# Patient Record
Sex: MALE | Race: WHITE | Employment: OTHER | URBAN - METROPOLITAN AREA
[De-identification: names, ages, dates, MRNs, and addresses within clinical notes are randomized per-mention and may not be internally consistent; named-entity substitution may affect disease eponyms.]

---

## 2018-01-14 PROBLEM — I10 HTN (HYPERTENSION): Status: ACTIVE | Noted: 2018-01-14

## 2018-01-14 PROBLEM — E78.00 HIGH CHOLESTEROL: Status: ACTIVE | Noted: 2018-01-14

## 2018-01-14 PROBLEM — F41.9 ANXIETY: Status: ACTIVE | Noted: 2018-01-14

## 2018-01-14 PROBLEM — R07.9 CHEST PAIN: Status: ACTIVE | Noted: 2018-01-14

## 2018-06-20 ENCOUNTER — HOSPITAL ENCOUNTER (OUTPATIENT)
Dept: ENDOSCOPY | Age: 78
Discharge: HOME | End: 2018-06-20
Attending: INTERNAL MEDICINE | Admitting: INTERNAL MEDICINE

## 2018-06-20 ASSESSMENT — ENCOUNTER SYMPTOMS: SHORTNESS OF BREATH: 0

## 2019-01-24 ENCOUNTER — HOSPITAL ENCOUNTER (OUTPATIENT)
Age: 79
Discharge: HOME | End: 2019-01-24
Payer: MEDICARE

## 2019-01-24 PROCEDURE — 83516 IMMUNOASSAY NONANTIBODY: CPT

## 2019-01-25 ENCOUNTER — HOSPITAL ENCOUNTER (OUTPATIENT)
Age: 79
Discharge: HOME | End: 2019-01-25
Payer: MEDICARE

## 2019-01-25 PROCEDURE — 83993 ASSAY FOR CALPROTECTIN FECAL: CPT

## 2019-01-26 LAB — TISSUE TRANSGLUTAMINASE IGA: 0 U/ML (ref 0–3)

## 2019-01-29 LAB — CALPROTECTIN: 20 UG/G

## 2020-02-26 ENCOUNTER — OFFICE VISIT (OUTPATIENT)
Dept: ORTHOPEDIC SURGERY | Age: 80
End: 2020-02-26
Payer: MEDICARE

## 2020-02-26 VITALS — WEIGHT: 185 LBS | HEIGHT: 73 IN | BODY MASS INDEX: 24.52 KG/M2

## 2020-02-26 PROCEDURE — 1036F TOBACCO NON-USER: CPT | Performed by: ORTHOPAEDIC SURGERY

## 2020-02-26 PROCEDURE — 4040F PNEUMOC VAC/ADMIN/RCVD: CPT | Performed by: ORTHOPAEDIC SURGERY

## 2020-02-26 PROCEDURE — G8427 DOCREV CUR MEDS BY ELIG CLIN: HCPCS | Performed by: ORTHOPAEDIC SURGERY

## 2020-02-26 PROCEDURE — 1123F ACP DISCUSS/DSCN MKR DOCD: CPT | Performed by: ORTHOPAEDIC SURGERY

## 2020-02-26 PROCEDURE — G8484 FLU IMMUNIZE NO ADMIN: HCPCS | Performed by: ORTHOPAEDIC SURGERY

## 2020-02-26 PROCEDURE — G8420 CALC BMI NORM PARAMETERS: HCPCS | Performed by: ORTHOPAEDIC SURGERY

## 2020-02-26 PROCEDURE — 99203 OFFICE O/P NEW LOW 30 MIN: CPT | Performed by: ORTHOPAEDIC SURGERY

## 2020-02-26 RX ORDER — MELOXICAM 15 MG/1
15 TABLET ORAL DAILY
Qty: 30 TABLET | Refills: 3 | Status: SHIPPED | OUTPATIENT
Start: 2020-02-26

## 2020-02-26 NOTE — PROGRESS NOTES
Chief Complaint    Shoulder Pain (left shoulder)      History of Present Illness:  Fam Burk is a 79 y.o. male. He is here today for evaluation of his left shoulder. He is right-hand dominant. He has had left shoulder pain that is been getting progressively worse over the last 8 months. Denies any particular injury to his shoulder which onset of the pain. The main thing he notices is pain through range of motion as well as some crepitus within the shoulder. Also feels like he is losing some strength around his left shoulder. He denies radicular pain. Denies numbness or tingling. Currently not using any medications to help treat the shoulder. Medical History:  Patient's medications, allergies, past medical, surgical, social and family histories were reviewed and updated as appropriate. Review of Systems:  Pertinent items are noted in HPI  Review of systems reviewed from Patient History Form dated on 2/26/20 and available in the patient's chart under the Media tab. Vital Signs:  Ht 6' 1\" (1.854 m)   Wt 185 lb (83.9 kg)   BMI 24.41 kg/m²     General Exam:   Constitutional: Patient is adequately groomed with no evidence of malnutrition  DTRs: Deep tendon reflexes are intact  Mental Status: The patient is oriented to time, place and person. The patient's mood and affect are appropriate. Shoulder Examination:    Inspection: No significant swelling erythema noted but the left shoulder today    Palpation: There is some palpable crepitus over the left shoulder through range of motion. No tenderness over the [facility] [place] joint    Range of Motion: Active forward elevation today is 110 degrees. Passively we can get him to 140 degrees. External rotation is 30 degrees    Strength:  There is some weakness noted with rotator cuff testing in all directions secondary to pain inhibition    Special Tests: Negative drop arm exam.  There is some pain weakness with Drayton's active compression testing    Skin: There are no rashes, ulcerations or lesions. Gait: Normal      Additional Comments:       Additional Examinations:         Right Upper Extremity:  Examination of the right upper extremity does not show any tenderness, deformity or injury. Range of motion is unremarkable. There is no gross instability. There are no rashes, ulcerations or lesions. Strength and tone are normal.    Radiology:     X-rays obtained and reviewed in office:  Views 4 views left shoulder demonstrates no obvious fracture dislocation. There is significant glenohumeral degenerative change with joint space loss and osteophyte formation. Assessment : Left shoulder glenohumeral arthritis    Impression:  Encounter Diagnoses   Name Primary?  Left shoulder pain, unspecified chronicity Yes    Shoulder arthritis        Office Procedures:  Orders Placed This Encounter   Procedures    XR SHOULDER LEFT (MIN 2 VIEWS)     Standing Status:   Future     Number of Occurrences:   1     Standing Expiration Date:   2/26/2021    Ambulatory referral to Physical Therapy     Referral Priority:   Routine     Referral Type:   Eval and Treat     Referral Reason:   Specialty Services Required     Requested Specialty:   Physical Therapy     Number of Visits Requested:   1       Treatment Plan: I discussed the diagnosis and treatment options with him today. Recommend at this time referral to physical therapy to work on range of motion and strengthening. I am also going to place him on to a prescription for meloxicam 15 mg that he can use daily. We also did briefly discuss total shoulder arthroplasty but he does not want to consider that at this point in time.   He would be a good candidate in the future though if were not able to control his symptoms nonoperatively

## 2020-03-03 ENCOUNTER — HOSPITAL ENCOUNTER (OUTPATIENT)
Dept: PHYSICAL THERAPY | Age: 80
Setting detail: THERAPIES SERIES
Discharge: HOME | End: 2020-03-03
Payer: MEDICARE

## 2020-03-03 PROCEDURE — 97110 THERAPEUTIC EXERCISES: CPT

## 2020-03-03 PROCEDURE — G0283 ELEC STIM OTHER THAN WOUND: HCPCS

## 2020-03-03 PROCEDURE — 97140 MANUAL THERAPY 1/> REGIONS: CPT

## 2020-03-03 PROCEDURE — 97161 PT EVAL LOW COMPLEX 20 MIN: CPT

## 2020-03-03 NOTE — FLOWSHEET NOTE
scapular, scapulothoracic and UE control with self care, reaching, carrying, lifting, house/yardwork, driving/computer work.    [] (48751) Provided verbal/tactile cueing for activities related to improving balance, coordination, kinesthetic sense, posture, motor skill, proprioception  to assist with  scapular, scapulothoracic and UE control with self care, reaching, carrying, lifting, house/yardwork, driving/computer work. Therapeutic Activities:    [x] (26116 or 26310) Provided verbal/tactile cueing for activities related to improving balance, coordination, kinesthetic sense, posture, motor skill, proprioception and motor activation to allow for proper function of scapular, scapulothoracic and UE control with self care, carrying, lifting, driving/computer work.      Home Exercise Program:    [x] (80898) Reviewed/Progressed HEP activities related to strengthening, flexibility, endurance, ROM of scapular, scapulothoracic and UE control with self care, reaching, carrying, lifting, house/yardwork, driving/computer work  [] (34634) Reviewed/Progressed HEP activities related to improving balance, coordination, kinesthetic sense, posture, motor skill, proprioception of scapular, scapulothoracic and UE control with self care, reaching, carrying, lifting, house/yardwork, driving/computer work      Manual Treatments:  PROM / STM / Oscillations-Mobs:  G-I, II, III, IV (PA's, Inf., Post.)  [x] (31879) Provided manual therapy to mobilize soft tissue/joints of cervical/CT, scapular GHJ and UE for the purpose of modulating pain, promoting relaxation,  increasing ROM, reducing/eliminating soft tissue swelling/inflammation/restriction, improving soft tissue extensibility and allowing for proper ROM for normal function with self care, reaching, carrying, lifting, house/yardwork, driving/computer work    Modalities:  TENS x 15 min    Charges:  Timed Code Treatment Minutes: eval +25   Total Treatment Minutes: 70       [x] EVAL (LOW)

## 2020-03-03 NOTE — PLAN OF CARE
[]Disc pathology   []Congenital spine pathologies   []Prior surgical intervention  []Osteoporosis (M81.8)  []Osteopenia (M85.8)   Endocrine conditions   []Hypothyroid (E03.9)  []Hyperthyroid Gastrointestinal conditions   []Constipation (R15.10)   Metabolic conditions   []Morbid obesity (E66.01)  []Diabetes type 1(E10.65) or 2 (E11.65)   []Neuropathy (G60.9)     Pulmonary conditions   []Asthma (J45)  []Coughing   []COPD (J44.9)   Psychological Disorders  []Anxiety (F41.9)  []Depression (F32.9)   []Other:   []Other:          Barriers to/and or personal factors that will affect rehab potential:              [x]Age  []Sex              []Motivation/Lack of Motivation                        []Co-Morbidities              []Cognitive Function, education/learning barriers              []Environmental, home barriers              []profession/work barriers  []past PT/medical experience  []other:  Justification:      Falls Risk Assessment (30 days):   [x] Falls Risk assessed and no intervention required.   [] Falls Risk assessed and Patient requires intervention due to being higher risk   TUG score (>12s at risk):     [] Falls education provided, including        ASSESSMENT: s/s consistent with left GH OA  Functional Impairments   [x]Noted spinal or UE joint hypomobility   []Noted spinal or UE joint hypermobility   [x]Decreased UE functional ROM   [x]Decreased UE functional strength   []Abnormal reflexes/sensation/myotomal/dermatomal deficits   [x]Decreased RC/scapular/core strength and neuromuscular control   []other:      Functional Activity Limitations (from functional questionnaire and intake)   [x]Reduced ability to tolerate prolonged functional positions   [x]Reduced ability or difficulty with changes of positions or transfers between positions   []Reduced ability to maintain good posture and demonstrate good body mechanics with sitting, bending, and lifting   [] Reduced ability or tolerance with driving and/or computer limitations, and/or participation restrictions;:  [x] a total of 1-2 or more elements   [] a total of 3 or more elements   [] a total of 4 or more elements   [x] A clinical presentation with:  [x] stable and/or uncomplicated characteristics   [] evolving clinical presentation with changing characteristics  [] unstable and unpredictable characteristics;   [x] Clinical decision making of [x] low, [] moderate, [] high complexity using standardized patient assessment instrument and/or measurable assessment of functional outcome. [x] EVAL (LOW) 26563 (typically 30 minutes face-to-face)  [] EVAL (MOD) 09245 (typically 30 minutes face-to-face)  [] EVAL (HIGH) 42845 (typically 45 minutes face-to-face)  [] RE-EVAL     PLAN:  Frequency/Duration:  1-2 days per week for 4-6 Weeks:  INTERVENTIONS:  [x] Therapeutic exercise including: strength training, ROM, for Upper extremity and core   [x]  NMR activation and proprioception for UE, scap and Core   [x] Manual therapy as indicated for shoulder, scapula and spine to include: Dry Needling/IASTM, STM, PROM, Gr I-IV mobilizations, manipulation. [x] Modalities as needed that may include: thermal agents, E-stim, Biofeedback, US, iontophoresis as indicated  [x] Patient education on joint protection, postural re-education, activity modification, progression of HEP. HEP instruction: Patient instructed in, and demonstrated proper form of, exercises. Copy of exercises scanned into media file  (see scanned forms)    GOALS:  Patient stated goal: resume swing dancing without issues, increase ROM  [] Progressing: [] Met: [] Not Met: [] Adjusted    Therapist goals for Patient:   Short Term Goals: To be achieved in: 2 weeks  1. Independent in HEP and progression per patient tolerance, in order to prevent re-injury. [] Progressing: [] Met: [] Not Met: [] Adjusted  2.  Patient will have a decrease in pain to facilitate improvement in movement, function, and ADLs as indicated by Functional Deficits. [] Progressing: [] Met: [] Not Met: [] Adjusted    Long Term Goals: To be achieved in: 4-6 weeks  1. Disability index score of 10% or less for the DASH to assist with reaching prior level of function. [] Progressing: [] Met: [] Not Met: [] Adjusted  2. Patient will demonstrate increased AROM to [facility] to allow for proper joint functioning as indicated by patients Functional Deficits. [] Progressing: [] Met: [] Not Met: [] Adjusted  3. Patient will demonstrate an increase in Strength to within 5lbs RUE to allow for proper functional mobility as indicated by patients Functional Deficits. [] Progressing: [] Met: [] Not Met: [] Adjusted  4. Patient will return to [place] reaching functional activities without increased symptoms or restriction. [] Progressing: [] Met: [] Not Met: [] Adjusted  5. Pt will tolerate swing dancing without increased symptoms   [] Progressing: [] Met: [] Not Met: [] Adjusted     Electronically signed by:   Donita Silva, PT

## 2020-03-05 ENCOUNTER — HOSPITAL ENCOUNTER (OUTPATIENT)
Dept: PHYSICAL THERAPY | Age: 80
Setting detail: THERAPIES SERIES
Discharge: HOME | End: 2020-03-05
Payer: MEDICARE

## 2020-03-05 PROCEDURE — 97110 THERAPEUTIC EXERCISES: CPT

## 2020-03-05 PROCEDURE — 97140 MANUAL THERAPY 1/> REGIONS: CPT

## 2020-03-05 NOTE — FLOWSHEET NOTE
[facility]    Physical Therapy Treatment Note/ Progress Report:     Date:  3/5/2020    Patient Name:  Sarita Armenta    :  1940  MRN: [id number]  Restrictions/Precautions:    Medical/Treatment Diagnosis Information:  · Diagnosis: L shoulder OA M19.019  · Treatment Diagnosis: L shoulder pain  Insurance/Certification information:  PT Insurance Information: Medicare  Physician Information:  Referring Practitioner: Fletcher Matute MD  Plan of care signed (Y/N):     Date of Patient follow up with Physician:      Progress Report: []  Yes  [x]  No     Functional Scale:  3/3/2020 QuickDASH 29% disability    Date Range for reporting period:  Beginning:  3/3/2020  Ending:      Progress report due (10 Rx/or 30 days whichever is less): 3/6/8793     Recertification due (POC duration/ or 90 days whichever is less): 2020     Visit # Insurance Allowable Auth Needed   2 medicare []Yes    [x]No     Pain level: 2/10 with reaching     SUBJECTIVE:  Shoulder is feeling better both with the exercises and meloxicam    OBJECTIVE:    Observation:    Test measurements:      RESTRICTIONS/PRECAUTIONS: *flexion, HBB    Exercises/Interventions:   Therapeutic Ex Wt / Resistance Sets/sec Reps Notes   Arm bike       Wand press+flexion 2# 2 10    Serratus punches 3# 3 10     1# 3 10 Held today -  Increased crepitus   SL abduction 0# 2 10    Prone rows/ext/  HABD 3# 3 10           TB rows/ext green 3 10    TB ER/IR isos blue 2 10                                                                                                      Manual Intervention 10'       Shld /GH Mobs  6 min  Improved ROM   Post Cap mobs       Thoracic/Rib manipualtion  4 min  Slight inc ROM   CT MT/Mobs       PROM MT                     NMR re-education       scap depression with scaption  1 15 At 1/2 wall   Body blade attempted                               Therapeutic Exercise and NMR Patient is progressing as expected towards functional goals listed. [] Progression is slowed due to complexities listed. [] Progression has been slowed due to co-morbidities. [] Plan just implemented, too soon to assess goals progression  [] Other:     ASSESSMENT:  Pt with palpable clunk and crepitus with ER based movements, but tolerated isometrics well. Pt has difficulty maintaining good scapular mechanics with shoulder flexion greater than 90 deg. Treatment/Activity Tolerance:  [x] Patient tolerated treatment well [] Patient limited by fatique  [] Patient limited by pain  [] Patient limited by other medical complications  [] Other:     Overall Progression Towards Functional goals/ Treatment Progress Update:  [] Patient is progressing as expected towards functional goals listed. [] Progression is slowed due to complexities/Impairments listed. [] Progression has been slowed due to co-morbidities. [x] Plan just implemented, too soon to assess goals progression <30days   [] Goals require adjustment due to lack of progress  [] Patient is not progressing as expected and requires additional follow up with physician  [] Other    Prognosis for POC: [x] Good [] Fair  [] Poor    Patient requires continued skilled intervention: [x] Yes  [] No      PLAN:   [x] Continue per plan of care [] Alter current plan (see comments)  [] Plan of care initiated [] Hold pending MD visit [] Discharge    Electronically signed by: Corine North PT     Note: If patient does not return for scheduled/recommended follow up visits, this note will serve as a discharge from care along with the most recent update on progress.

## 2020-03-10 ENCOUNTER — HOSPITAL ENCOUNTER (OUTPATIENT)
Dept: PHYSICAL THERAPY | Age: 80
Setting detail: THERAPIES SERIES
Discharge: HOME | End: 2020-03-10
Payer: MEDICARE

## 2020-03-10 PROCEDURE — 97112 NEUROMUSCULAR REEDUCATION: CPT | Performed by: SPECIALIST/TECHNOLOGIST

## 2020-03-10 PROCEDURE — 97110 THERAPEUTIC EXERCISES: CPT | Performed by: SPECIALIST/TECHNOLOGIST

## 2020-03-10 PROCEDURE — 97140 MANUAL THERAPY 1/> REGIONS: CPT | Performed by: SPECIALIST/TECHNOLOGIST

## 2020-03-10 NOTE — FLOWSHEET NOTE
[facility]    Physical Therapy Treatment Note/ Progress Report:     Date:  3/10/2020    Patient Name:  Rosendo De León    :  1940  MRN: [id number]  Restrictions/Precautions:    Medical/Treatment Diagnosis Information:  · Diagnosis: L shoulder OA M19.019  · Treatment Diagnosis: L shoulder pain  Insurance/Certification information:  PT Insurance Information: Medicare  Physician Information:  Referring Practitioner: Krzysztof Cronin MD  Plan of care signed (Y/N):     Date of Patient follow up with Physician: Jose J Her     Progress Report: []  Yes  [x]  No     Functional Scale:  3/3/2020 QuickDASH 29% disability    Date Range for reporting period:  Beginning:  3/3/2020  Ending:      Progress report due (10 Rx/or 30 days whichever is less): 5956     Recertification due (POC duration/ or 90 days whichever is less): 2020     Visit # Insurance Allowable Auth Needed   3 medicare []Yes    [x]No     Pain level: 2/10 with reaching     SUBJECTIVE:  Pt reports having some improvements in Rom with his left shoulder. Continues to have some pain with OH movements    OBJECTIVE:    Observation:    Test measurements:     3/10/20 PROM increased crepitice with Er.  Stiffness with Flexion and Abd that was improved with  GH mobilizations    RESTRICTIONS/PRECAUTIONS: *flexion, HBB  Exercises/Interventions:  28'  Therapeutic Ex Wt / Resistance Sets/sec Reps Notes   Arm bike   Pulleys Abd     5'    Wand press+flexion 3# 2 10x    Serratus punches 3# 3 10x    SL ER/ punch 1# 3 10 Decreased ROM, less creptice with scap retraction   SL abduction 0# 2 10x    Prone Rows/Ext/  HABD 3# 3 10x           TB rows/ext   high rows blue  green 3 10x    TB ER/IR  green 2 10x Posture cues needed   No money  lime 2 10x                                                                                               Manual Intervention 10'       Shld /GH Mobs  6 min indicated by patients Functional Deficits. [] Progressing: [] Met: [] Not Met: [] Adjusted  4. Patient will return to [place] reaching functional activities without increased symptoms or restriction. [] Progressing: [] Met: [] Not Met: [] Adjusted  5. Pt will tolerate swing dancing without increased symptoms   [] Progressing: [] Met: [] Not Met: [] Adjusted     Progression Towards Functional goals:  [x] Patient is progressing as expected towards functional goals listed. [] Progression is slowed due to complexities listed. [] Progression has been slowed due to co-morbidities. [] Plan just implemented, too soon to assess goals progression  [] Other:     ASSESSMENT:  Pt with palpable clunk and crepitus with ER based movements, but tolerated progressions of TB and SL Er with decreased crepitice free ROM with emphasis on scap. retraction. Pt. demonstrated increased Left shoulder ROM after [address] mobilizations were performed, patient also had less crepitice. Denied pain from TB supine and OH progressions and \"shoulder felt good with exercises\" today. Treatment/Activity Tolerance:  [x] Patient tolerated treatment well [x] Patient limited by fatique  [] Patient limited by pain  [] Patient limited by other medical complications  [] Other:     Overall Progression Towards Functional goals/ Treatment Progress Update:  [] Patient is progressing as expected towards functional goals listed. [] Progression is slowed due to complexities/Impairments listed. [] Progression has been slowed due to co-morbidities.   [x] Plan just implemented, too soon to assess goals progression <30days   [] Goals require adjustment due to lack of progress  [] Patient is not progressing as expected and requires additional follow up with physician  [] Other    Prognosis for POC: [x] Good [] Fair  [] Poor    Patient requires continued skilled intervention: [x] Yes  [] No      PLAN: Monitor posture especially with OH activities bilaterally, ice PRN  [x] Continue per plan of care [] Alter current plan (see comments)  [] Plan of care initiated [] Hold pending MD visit [] Discharge    Electronically signed by: Devin Cool, [facility], [id number]    Note: If patient does not return for scheduled/recommended follow up visits, this note will serve as a discharge from care along with the most recent update on progress.

## 2020-03-12 ENCOUNTER — HOSPITAL ENCOUNTER (OUTPATIENT)
Dept: PHYSICAL THERAPY | Age: 80
Setting detail: THERAPIES SERIES
Discharge: HOME | End: 2020-03-12
Payer: MEDICARE

## 2020-03-12 PROCEDURE — 97110 THERAPEUTIC EXERCISES: CPT

## 2020-03-12 PROCEDURE — 97112 NEUROMUSCULAR REEDUCATION: CPT

## 2020-03-12 PROCEDURE — 97140 MANUAL THERAPY 1/> REGIONS: CPT

## 2020-03-12 NOTE — FLOWSHEET NOTE
[facility]    Physical Therapy Treatment Note/ Progress Report:     Date:  3/12/2020    Patient Name:  Yee Mcpherson    :  1940  MRN: [id number]  Restrictions/Precautions:    Medical/Treatment Diagnosis Information:  · Diagnosis: L shoulder OA M19.019  · Treatment Diagnosis: L shoulder pain  Insurance/Certification information:  PT Insurance Information: Medicare  Physician Information:  Referring Practitioner: Sindhu Stauffer MD  Plan of care signed (Y/N):     Date of Patient follow up with Physician: Annmarie Gale     Progress Report: []  Yes  [x]  No     Functional Scale:  3/3/2020 QuickDASH 29% disability    Date Range for reporting period:  Beginning:  3/3/2020  Ending:      Progress report due (10 Rx/or 30 days whichever is less): 9318     Recertification due (POC duration/ or 90 days whichever is less): 2020     Visit # Insurance Allowable Auth Needed   4 medicare []Yes    [x]No     Pain level: 2/10 with reaching     SUBJECTIVE:  Pt reports having some improvements in Rom with his left shoulder. Continues to have some pain with OH movements    OBJECTIVE:    Observation:    Test measurements:     3/10/20 PROM increased crepitice with Er.  Stiffness with Flexion and Abd that was improved with  GH mobilizations    RESTRICTIONS/PRECAUTIONS: *flexion, HBB  Exercises/Interventions:  28'  Therapeutic Ex Wt / Resistance Sets/sec Reps Notes   Arm bike   Pulleys Abd     5'    Wand press+flexion 3# 2 12x    Serratus punches 3# 3 10x    SL ER/ punch 1# 3 10 Decreased ROM, less creptice with scap retraction   SL abduction 0# 3 10x    Prone Rows/Ext/  HABD 3# 3 10x           TB rows/ext   high rows blue  green 3 10x    TB ER/IR  green 2 10x Posture cues needed   No money  lime 2 10x                                                                                               Manual Intervention 10'       Shld /GH Mobs  6 min Provided manual therapy to mobilize soft tissue/joints of cervical/CT, scapular GHJ and UE for the purpose of modulating pain, promoting relaxation,  increasing ROM, reducing/eliminating soft tissue swelling/inflammation/restriction, improving soft tissue extensibility and allowing for proper ROM for normal function with self care, reaching, carrying, lifting, house/yardwork, driving/computer work    Modalities:  Declined    Charges:  Timed Code Treatment Minutes: 55   Total Treatment Minutes: 55       [] EVAL (LOW) 19909 (typically 20 minutes face-to-face)  [] EVAL (MOD) 49721 (typically 30 minutes face-to-face)  [] EVAL (HIGH) 17267 (typically 45 minutes face-to-face)  [] RE-EVAL     [x] KH(58922) x   2  [] IONTO (83322)  [x] NMR (24982) x  1   [] VASO (20895)  [x] Manual (53280) x 1    [] Other:  [] TA (55363)x     [] Mech Traction (43817)  [] ES(attended) (45316)     [] ES (un) (85907):     GOALS:  Patient stated goal: resume swing dancing without issues, increase ROM  [] Progressing: [] Met: [] Not Met: [] Adjusted    Therapist goals for Patient:   Short Term Goals: To be achieved in: 2 weeks  1. Independent in HEP and progression per patient tolerance, in order to prevent re-injury. [] Progressing: [] Met: [] Not Met: [] Adjusted  2. Patient will have a decrease in pain to facilitate improvement in movement, function, and ADLs as indicated by Functional Deficits. [] Progressing: [] Met: [] Not Met: [] Adjusted    Long Term Goals: To be achieved in: 4-6 weeks  1. Disability index score of 10% or less for the DASH to assist with reaching prior level of function. [] Progressing: [] Met: [] Not Met: [] Adjusted  2. Patient will demonstrate increased AROM to [facility] to allow for proper joint functioning as indicated by patients Functional Deficits. [] Progressing: [] Met: [] Not Met: [] Adjusted  3.  Patient will demonstrate an increase in Strength to within 5lbs RUE to allow for proper functional mobility as Alter current plan (see comments)  [] Plan of care initiated [] Hold pending MD visit [] Discharge    Electronically signed by: Tesfaye Layton PTA, [id number]    Note: If patient does not return for scheduled/recommended follow up visits, this note will serve as a discharge from care along with the most recent update on progress.

## 2020-03-17 ENCOUNTER — APPOINTMENT (OUTPATIENT)
Dept: PHYSICAL THERAPY | Age: 80
End: 2020-03-17
Payer: MEDICARE

## 2020-03-19 ENCOUNTER — APPOINTMENT (OUTPATIENT)
Dept: PHYSICAL THERAPY | Age: 80
End: 2020-03-19
Payer: MEDICARE

## 2020-03-24 ENCOUNTER — APPOINTMENT (OUTPATIENT)
Dept: PHYSICAL THERAPY | Age: 80
End: 2020-03-24
Payer: MEDICARE

## 2020-03-26 ENCOUNTER — APPOINTMENT (OUTPATIENT)
Dept: PHYSICAL THERAPY | Age: 80
End: 2020-03-26
Payer: MEDICARE

## 2020-06-03 ENCOUNTER — OFFICE VISIT (OUTPATIENT)
Dept: ORTHOPEDIC SURGERY | Age: 80
End: 2020-06-03
Payer: MEDICARE

## 2020-06-03 VITALS — HEIGHT: 73 IN | BODY MASS INDEX: 24.51 KG/M2 | WEIGHT: 184.97 LBS

## 2020-06-03 PROCEDURE — 1036F TOBACCO NON-USER: CPT | Performed by: ORTHOPAEDIC SURGERY

## 2020-06-03 PROCEDURE — 20610 DRAIN/INJ JOINT/BURSA W/O US: CPT | Performed by: ORTHOPAEDIC SURGERY

## 2020-06-03 PROCEDURE — G8420 CALC BMI NORM PARAMETERS: HCPCS | Performed by: ORTHOPAEDIC SURGERY

## 2020-06-03 PROCEDURE — 1123F ACP DISCUSS/DSCN MKR DOCD: CPT | Performed by: ORTHOPAEDIC SURGERY

## 2020-06-03 PROCEDURE — 4040F PNEUMOC VAC/ADMIN/RCVD: CPT | Performed by: ORTHOPAEDIC SURGERY

## 2020-06-03 PROCEDURE — G8427 DOCREV CUR MEDS BY ELIG CLIN: HCPCS | Performed by: ORTHOPAEDIC SURGERY

## 2020-06-03 PROCEDURE — 99213 OFFICE O/P EST LOW 20 MIN: CPT | Performed by: ORTHOPAEDIC SURGERY

## 2020-06-03 RX ORDER — METHYLPREDNISOLONE ACETATE 40 MG/ML
80 INJECTION, SUSPENSION INTRA-ARTICULAR; INTRALESIONAL; INTRAMUSCULAR; SOFT TISSUE ONCE
Status: COMPLETED | OUTPATIENT
Start: 2020-06-03 | End: 2020-06-03

## 2020-06-03 RX ADMIN — METHYLPREDNISOLONE ACETATE 80 MG: 40 INJECTION, SUSPENSION INTRA-ARTICULAR; INTRALESIONAL; INTRAMUSCULAR; SOFT TISSUE at 14:18

## 2020-06-03 NOTE — PROGRESS NOTES
Chief Complaint    Follow-up (left shoulder)      History of Present Illness:  Victor Manuel Le is a 79 y.o. male. Follow-up for his left shoulder. He is being treated for left shoulder glenohumeral arthritis. He was taking meloxicam and states his shoulder was doing a lot better but he did stop it several months ago when coronavirus did start. He also was doing therapy which was helpful as well. Denies radicular pain or numbness or tingling. Medical History:  Patient's medications, allergies, past medical, surgical, social and family histories were reviewed and updated as appropriate. Review of Systems:  Pertinent items are noted in HPI  Review of systems reviewed from Patient History Form dated on 6/3/20 and available in the patient's chart under the Media tab. Vital Signs:  Ht 6' 0.99\" (1.854 m)   Wt 184 lb 15.5 oz (83.9 kg)   BMI 24.41 kg/m²     General Exam:   Constitutional: Patient is adequately groomed with no evidence of malnutrition  DTRs: Deep tendon reflexes are intact  Mental Status: The patient is oriented to time, place and person. The patient's mood and affect are appropriate. Shoulder Examination:    Inspection: No significant swelling erythema noted but the left shoulder today     Palpation: There is some palpable crepitus over the left shoulder through range of motion. No tenderness over the [facility] joint     Range of Motion: Active forward elevation today is 110 degrees. Passively we can get him to 140 degrees. External rotation is 30 degrees     Strength:  There is some weakness noted with rotator cuff testing in all directions secondary to pain inhibition     Special Tests: Negative drop arm exam.  There is some pain weakness with [place]'s active compression testing     Skin: There are no rashes, ulcerations or lesions.     Gait: Normal    Additional Comments:       Additional Examinations:         Right Upper Extremity:  Examination of the right upper extremity does not show any tenderness, deformity or injury. Range of motion is unremarkable. There is no gross instability. There are no rashes, ulcerations or lesions. Strength and tone are normal.        Assessment : Left shoulder glenohumeral arthritis    Impression:  Encounter Diagnosis   Name Primary?  Shoulder arthritis Yes       Office Procedures:  No orders of the defined types were placed in this encounter. Treatment Plan: I discussed the diagnosis and treatment options with him today. Would recommend today trial of a cortisone injection into the left shoulder. He is agreeable with that plan. He is going to resume back on to meloxicam as well as he has been taking Krill oil as well as a collagen supplement. We did discuss that he does have the option for total shoulder arthroplasty in the future. I did encourage him to continue his therapy exercises for strengthening. Under sterile conditions the left shoulder was injected through posterior approach with 2 cc of 40 mg Depo-Medrol mixed with 3 cc quarter percent Marcaine. He did tolerate the injection well.   Postinjection precautions were given

## 2020-09-23 RX ORDER — MELOXICAM 15 MG/1
15 TABLET ORAL DAILY
Qty: 30 TABLET | Refills: 3 | Status: SHIPPED | OUTPATIENT
Start: 2020-09-23 | End: 2021-01-19

## 2020-11-23 ENCOUNTER — OFFICE VISIT (OUTPATIENT)
Dept: ORTHOPEDIC SURGERY | Age: 80
End: 2020-11-23
Payer: MEDICARE

## 2020-11-23 PROCEDURE — G8420 CALC BMI NORM PARAMETERS: HCPCS | Performed by: ORTHOPAEDIC SURGERY

## 2020-11-23 PROCEDURE — G8427 DOCREV CUR MEDS BY ELIG CLIN: HCPCS | Performed by: ORTHOPAEDIC SURGERY

## 2020-11-23 PROCEDURE — 20610 DRAIN/INJ JOINT/BURSA W/O US: CPT | Performed by: ORTHOPAEDIC SURGERY

## 2020-11-23 PROCEDURE — 99213 OFFICE O/P EST LOW 20 MIN: CPT | Performed by: ORTHOPAEDIC SURGERY

## 2020-11-23 PROCEDURE — 1123F ACP DISCUSS/DSCN MKR DOCD: CPT | Performed by: ORTHOPAEDIC SURGERY

## 2020-11-23 PROCEDURE — G8484 FLU IMMUNIZE NO ADMIN: HCPCS | Performed by: ORTHOPAEDIC SURGERY

## 2020-11-23 PROCEDURE — 4040F PNEUMOC VAC/ADMIN/RCVD: CPT | Performed by: ORTHOPAEDIC SURGERY

## 2020-11-23 PROCEDURE — 1036F TOBACCO NON-USER: CPT | Performed by: ORTHOPAEDIC SURGERY

## 2020-11-23 RX ORDER — METHYLPREDNISOLONE ACETATE 40 MG/ML
80 INJECTION, SUSPENSION INTRA-ARTICULAR; INTRALESIONAL; INTRAMUSCULAR; SOFT TISSUE ONCE
Status: COMPLETED | OUTPATIENT
Start: 2020-11-23 | End: 2020-11-23

## 2020-11-23 RX ADMIN — METHYLPREDNISOLONE ACETATE 80 MG: 40 INJECTION, SUSPENSION INTRA-ARTICULAR; INTRALESIONAL; INTRAMUSCULAR; SOFT TISSUE at 09:33

## 2020-11-23 NOTE — PROGRESS NOTES
Isha Katz 48 #:  [id number]    LOT #:  [id number]    Comment:
no gross instability. There are no rashes, ulcerations or lesions. Strength and tone are normal.          Assessment : Left shoulder osteoarthritis    Impression:  Encounter Diagnosis   Name Primary?  Shoulder arthritis Yes       Office Procedures:  Orders Placed This Encounter   Procedures    20610 - [place] DRAIN/INJECT LARGE JOINT/BURSA       Treatment Plan: We will proceed with cortisone injection left shoulder today. He has the option of these injections every 3 to 4 months as needed. We have discussed total shoulder arthroplasty as well of which he would be a candidate for. We will see him back in clinic for any further problems in the future    Under sterile conditions left shoulder was injected through posterior approach with 2 cc of 40 mg Depo-Medrol mixed with 3 cc of quarter percent Marcaine. He did tolerate the injection well.   Postinjection precautions were given

## 2021-01-14 DIAGNOSIS — M25.512 LEFT SHOULDER PAIN, UNSPECIFIED CHRONICITY: ICD-10-CM

## 2021-01-19 RX ORDER — MELOXICAM 15 MG/1
15 TABLET ORAL DAILY
Qty: 30 TABLET | Refills: 3 | Status: SHIPPED | OUTPATIENT
Start: 2021-01-19

## 2023-06-25 ENCOUNTER — APPOINTMENT (OUTPATIENT)
Dept: GENERAL RADIOLOGY | Age: 83
End: 2023-06-25
Payer: MEDICARE

## 2023-06-25 ENCOUNTER — HOSPITAL ENCOUNTER (EMERGENCY)
Age: 83
Discharge: HOME | End: 2023-06-25
Payer: MEDICARE

## 2023-06-25 VITALS
SYSTOLIC BLOOD PRESSURE: 141 MMHG | HEIGHT: 72 IN | BODY MASS INDEX: 25.73 KG/M2 | OXYGEN SATURATION: 95 % | HEART RATE: 63 BPM | RESPIRATION RATE: 20 BRPM | WEIGHT: 190 LBS | DIASTOLIC BLOOD PRESSURE: 75 MMHG | TEMPERATURE: 98.6 F

## 2023-06-25 DIAGNOSIS — M25.462 EFFUSION OF LEFT PREPATELLAR BURSA: Primary | ICD-10-CM

## 2023-06-25 DIAGNOSIS — T14.8XXA HEMATOMA: ICD-10-CM

## 2023-06-25 PROCEDURE — 73562 X-RAY EXAM OF KNEE 3: CPT

## 2023-06-25 PROCEDURE — 20610 DRAIN/INJ JOINT/BURSA W/O US: CPT

## 2023-06-25 PROCEDURE — 99283 EMERGENCY DEPT VISIT LOW MDM: CPT

## 2023-06-29 ENCOUNTER — TELEPHONE (OUTPATIENT)
Dept: ORTHOPEDIC SURGERY | Age: 83
End: 2023-06-29

## 2023-07-03 ENCOUNTER — HOSPITAL ENCOUNTER (EMERGENCY)
Age: 83
Discharge: HOME | End: 2023-07-03
Payer: MEDICARE

## 2023-07-03 VITALS
HEIGHT: 72 IN | SYSTOLIC BLOOD PRESSURE: 130 MMHG | TEMPERATURE: 97.6 F | WEIGHT: 190 LBS | RESPIRATION RATE: 16 BRPM | BODY MASS INDEX: 25.73 KG/M2 | HEART RATE: 59 BPM | DIASTOLIC BLOOD PRESSURE: 78 MMHG | OXYGEN SATURATION: 99 %

## 2023-07-03 DIAGNOSIS — M70.52 INFRAPATELLAR BURSITIS OF LEFT KNEE: Primary | ICD-10-CM

## 2023-07-03 LAB
ANION GAP SERPL CALCULATED.3IONS-SCNC: 13 MMOL/L (ref 3–16)
BASOPHILS # BLD: 0.3 K/UL (ref 0–0.2)
BASOPHILS NFR BLD: 2.5 %
BUN SERPL-MCNC: 20 MG/DL (ref 7–20)
CALCIUM SERPL-MCNC: 9.9 MG/DL (ref 8.3–10.6)
CHLORIDE SERPL-SCNC: 100 MMOL/L (ref 99–110)
CO2 SERPL-SCNC: 22 MMOL/L (ref 21–32)
CREAT SERPL-MCNC: 1.2 MG/DL (ref 0.8–1.3)
DEPRECATED RDW RBC AUTO: 14.2 % (ref 12.4–15.4)
EOSINOPHIL # BLD: 0.1 K/UL (ref 0–0.6)
EOSINOPHIL NFR BLD: 0.7 %
GFR SERPLBLD CREATININE-BSD FMLA CKD-EPI: >60 ML/MIN/{1.73_M2}
GLUCOSE SERPL-MCNC: 99 MG/DL (ref 70–99)
HCT VFR BLD AUTO: 45.6 % (ref 40.5–52.5)
HGB BLD-MCNC: 14.9 G/DL (ref 13.5–17.5)
INR PPP: 1.64 (ref 0.84–1.16)
LYMPHOCYTES # BLD: 1.3 K/UL (ref 1–5.1)
LYMPHOCYTES NFR BLD: 11.7 %
MCH RBC QN AUTO: 29.8 PG (ref 26–34)
MCHC RBC AUTO-ENTMCNC: 32.8 G/DL (ref 31–36)
MCV RBC AUTO: 91 FL (ref 80–100)
MONOCYTES # BLD: 0.9 K/UL (ref 0–1.3)
MONOCYTES NFR BLD: 7.9 %
NEUTROPHILS # BLD: 8.3 K/UL (ref 1.7–7.7)
NEUTROPHILS NFR BLD: 77.2 %
PLATELET # BLD AUTO: 289 K/UL (ref 135–450)
PMV BLD AUTO: 7.9 FL (ref 5–10.5)
POTASSIUM SERPL-SCNC: 4.9 MMOL/L (ref 3.5–5.1)
PROTHROMBIN TIME: 19.4 SEC (ref 11.5–14.8)
RBC # BLD AUTO: 5.01 M/UL (ref 4.2–5.9)
SODIUM SERPL-SCNC: 135 MMOL/L (ref 136–145)
WBC # BLD AUTO: 10.8 K/UL (ref 4–11)

## 2023-07-03 PROCEDURE — 85610 PROTHROMBIN TIME: CPT

## 2023-07-03 PROCEDURE — 80048 BASIC METABOLIC PNL TOTAL CA: CPT

## 2023-07-03 PROCEDURE — 85025 COMPLETE CBC W/AUTO DIFF WBC: CPT

## 2023-07-03 PROCEDURE — 99283 EMERGENCY DEPT VISIT LOW MDM: CPT

## 2023-07-03 RX ORDER — CLINDAMYCIN HYDROCHLORIDE 300 MG/1
300 CAPSULE ORAL 3 TIMES DAILY
Qty: 21 CAPSULE | Refills: 0 | Status: SHIPPED | OUTPATIENT
Start: 2023-07-03 | End: 2023-07-10

## 2023-07-03 ASSESSMENT — PAIN - FUNCTIONAL ASSESSMENT: PAIN_FUNCTIONAL_ASSESSMENT: 0-10

## 2023-07-03 ASSESSMENT — PAIN SCALES - GENERAL: PAINLEVEL_OUTOF10: 5

## 2023-07-03 NOTE — ED PROVIDER NOTES
[facility]        Pt Name: Sona Escobar  MRN: [id number]  [address] [facility] [place] 1940  Date of evaluation: 7/3/2023  Provider: Janelle Richard PA-C  PCP: David Liu DO  Note Started: 6:52 PM EDT 7/3/23      DEBI. I have evaluated this patient. CHIEF COMPLAINT       Chief Complaint   Patient presents with    Knee Pain     Pt states that he had an left knee injury last week, had some of the fluid drained while here. Pt states that he feels like his knee is infected, warm to touch, redness and swelling. Pt states orthopedic gave antibiotics. HISTORY OF PRESENT ILLNESS: 1 or more Elements     History From: Patient    Sona Escobar is a 81 y.o. male who presents to the emergency department with pain and swelling, redness and warmth involving the tibial tuberosity on the left. Currently on Keflex 500 mg. Patient concerned about emerging infection despite the use of this antibiotic. He does show me and appears to be violaceous in color. He was seen recently at this facility he did have needle aspiration of blood from the bursa. Patient does wear support at home. No fevers or chills. He is not tachycardic at 60 and afebrile at 97.6. Nursing Notes were all reviewed and agreed with or any disagreements were addressed in the HPI. REVIEW OF SYSTEMS :      Review of Systems    Positives and Pertinent negatives as per HPI.      SURGICAL HISTORY     Past Surgical History:   Procedure Laterality Date    BACK SURGERY      CARDIAC CATHETERIZATION      2011    CYST REMOVAL      ESOPHAGUS SURGERY  2015    FINGER TRIGGER RELEASE      HERNIA REPAIR      TONSILLECTOMY         CURRENTMEDICATIONS       Discharge Medication List as of 7/3/2023  6:01 PM        CONTINUE these medications which have NOT CHANGED    Details   !! meloxicam (MOBIC) 15 MG tablet TAKE 1 TABLET BY MOUTH DAILY, Disp-30 tablet, R-3Normal      !! meloxicam

## 2023-07-03 NOTE — DISCHARGE INSTRUCTIONS
I do recall the hematoma and being on Xarelto. Consider reduction in Xarelto under direction of your prescriber. I do understand warmth. Your WBC 10.3. I do understand Keflex. Discontinue Keflex and will start you on clindamycin 300 mg tablet 3 times daily. I would like you to follow-up with your orthopedist later this week for recheck.